# Patient Record
Sex: MALE | Race: WHITE
[De-identification: names, ages, dates, MRNs, and addresses within clinical notes are randomized per-mention and may not be internally consistent; named-entity substitution may affect disease eponyms.]

---

## 2023-10-01 ENCOUNTER — HOSPITAL ENCOUNTER (EMERGENCY)
Dept: HOSPITAL 46 - ED | Age: 78
Discharge: HOME | End: 2023-10-01
Payer: OTHER GOVERNMENT

## 2023-10-01 VITALS — HEIGHT: 68 IN | BODY MASS INDEX: 28.7 KG/M2 | WEIGHT: 189.38 LBS

## 2023-10-01 VITALS — DIASTOLIC BLOOD PRESSURE: 79 MMHG | SYSTOLIC BLOOD PRESSURE: 161 MMHG

## 2023-10-01 DIAGNOSIS — Z79.82: ICD-10-CM

## 2023-10-01 DIAGNOSIS — U07.1: Primary | ICD-10-CM

## 2023-10-01 DIAGNOSIS — Z79.899: ICD-10-CM

## 2023-10-01 LAB
ANION GAP SERPL CALCULATED.4IONS-SCNC: 12.8 MMOL/L (ref 7–21)
BASOPHILS NFR BLD AUTO: 0.9 % (ref 0–2)
BUN SERPL-MCNC: 11 MG/DL (ref 7–18)
BUN/CREAT SERPL: 12.22 (ref 6–28.6)
CALCIUM SERPL-MCNC: 8.9 MG/DL (ref 8.5–10.1)
CHLORIDE SERPL-SCNC: 98 MMOL/L (ref 98–107)
CO2 SERPL-SCNC: 28 MMOL/L (ref 21–32)
DEPRECATED RDW RBC AUTO: 14.1 FL (ref 10.5–15)
EGFRCR SERPLBLD CKD-EPI 2021: 87 ML/MIN (ref 60–?)
EOSINOPHIL NFR BLD AUTO: 0.1 % (ref 0–6)
FLUBV RNA RESP QL NAA+PROBE: NEGATIVE
HCT VFR BLD AUTO: 42.4 % (ref 35–50)
HGB BLD-MCNC: 14 G/DL (ref 12–18)
LYMPHOCYTES NFR BLD AUTO: 27.8 % (ref 24–44)
MCH RBC QN AUTO: 28.4 PG (ref 27–36)
MCHC RBC AUTO-ENTMCNC: 33 G/DL (ref 30–36)
MCV RBC AUTO: 86.1 FL (ref 81–99)
MONOCYTES NFR BLD AUTO: 11.6 % (ref 0–12)
NEUTROPHILS NFR BLD AUTO: 59.6 % (ref 39–80)
PLATELET # BLD AUTO: 217 K/UL (ref 140–440)
POTASSIUM SERPL-SCNC: 3.8 MMOL/L (ref 3.5–5.1)
RBC # BLD AUTO: 4.93 M/UL (ref 4.3–5.7)
RSV RNA ISLT QL NAA+PROBE: NEGATIVE

## 2023-10-01 PROCEDURE — U0002 COVID-19 LAB TEST NON-CDC: HCPCS

## 2025-03-11 ENCOUNTER — HOSPITAL ENCOUNTER (EMERGENCY)
Dept: HOSPITAL 46 - ED | Age: 80
Discharge: HOME | End: 2025-03-11
Payer: OTHER GOVERNMENT

## 2025-03-11 VITALS — WEIGHT: 178.58 LBS | BODY MASS INDEX: 27.06 KG/M2 | HEIGHT: 68 IN

## 2025-03-11 VITALS — DIASTOLIC BLOOD PRESSURE: 51 MMHG | SYSTOLIC BLOOD PRESSURE: 123 MMHG

## 2025-03-11 DIAGNOSIS — Z79.51: ICD-10-CM

## 2025-03-11 DIAGNOSIS — Z79.899: ICD-10-CM

## 2025-03-11 DIAGNOSIS — C41.9: Primary | ICD-10-CM

## 2025-03-11 DIAGNOSIS — J44.89: ICD-10-CM

## 2025-03-11 PROCEDURE — A9270 NON-COVERED ITEM OR SERVICE: HCPCS

## 2025-03-11 NOTE — EKG
[facility]
                                    [address] [place]  [address]
_________________________________________________________________________________________
                                                                 Signed   
 
 
Sinus rhythm with premature atrial complexes
Otherwise normal ECG
No previous ECGs available
Confirmed by Vikas Mccauley MD ([id number]) on 3/11/2025 10:14:44 PM
 
 
 
 
 
 
 
 
 
 
 
 
 
 
 
 
 
 
 
 
 
 
 
 
 
 
 
 
 
 
 
 
 
 
 
 
 
 
 
 
 
    Electronically Signed By: VIKAS MCCAULEY MD  03/11/25 2215
_________________________________________________________________________________________
PATIENT NAME:     THOMAS PARNELL                
MEDICAL RECORD #: [id number]                     Electrocardiogram             
          ACCT #: [id number]  
DATE OF BIRTH:   [birth year]                                       
PHYSICIAN:   VIKAS MCCAULEY MD                 REPORT #: [id number]
REPORT IS CONFIDENTIAL AND NOT TO BE RELEASED WITHOUT AUTHORIZATION

## 2025-05-01 ENCOUNTER — HOSPITAL ENCOUNTER (EMERGENCY)
Dept: HOSPITAL 46 - ED | Age: 80
Discharge: HOME | End: 2025-05-01
Payer: OTHER GOVERNMENT

## 2025-05-01 VITALS — SYSTOLIC BLOOD PRESSURE: 154 MMHG | DIASTOLIC BLOOD PRESSURE: 64 MMHG

## 2025-05-01 DIAGNOSIS — R33.9: ICD-10-CM

## 2025-05-01 DIAGNOSIS — J44.89: ICD-10-CM

## 2025-05-01 DIAGNOSIS — Z79.899: ICD-10-CM

## 2025-05-01 DIAGNOSIS — Z79.51: ICD-10-CM

## 2025-05-01 DIAGNOSIS — K59.00: Primary | ICD-10-CM

## 2025-05-01 LAB
BACTERIA #/AREA URNS HPF: (no result) /HPF
CASTS #/AREA URNS LPF: (no result) "LPF"
CRYSTALS URNS MICRO: (no result)
EPI CELLS #/AREA URNS HPF: (no result) /LPF
HGB UR QL STRIP: NEGATIVE
KETONES UR QL STRIP: (no result)
LEUKOCYTE ESTERASE UR QL STRIP: NEGATIVE
NITRITE UR QL STRIP: NEGATIVE
URN SPEC COLLECT METH UR: (no result)

## 2025-05-01 PROCEDURE — A4311 CATHETER W/O BAG 2-WAY LATEX: HCPCS
